# Patient Record
Sex: MALE | Race: WHITE
[De-identification: names, ages, dates, MRNs, and addresses within clinical notes are randomized per-mention and may not be internally consistent; named-entity substitution may affect disease eponyms.]

---

## 2022-02-23 ENCOUNTER — HOSPITAL ENCOUNTER (EMERGENCY)
Dept: HOSPITAL 47 - EC | Age: 37
Discharge: HOME | End: 2022-02-23
Payer: COMMERCIAL

## 2022-02-23 VITALS — SYSTOLIC BLOOD PRESSURE: 134 MMHG | HEART RATE: 99 BPM | DIASTOLIC BLOOD PRESSURE: 91 MMHG

## 2022-02-23 VITALS — TEMPERATURE: 98.9 F

## 2022-02-23 VITALS — RESPIRATION RATE: 18 BRPM

## 2022-02-23 DIAGNOSIS — K81.9: Primary | ICD-10-CM

## 2022-02-23 LAB
ALBUMIN SERPL-MCNC: 4.8 G/DL (ref 3.5–5)
ALP SERPL-CCNC: 53 U/L (ref 38–126)
ALT SERPL-CCNC: 92 U/L (ref 4–49)
AMYLASE SERPL-CCNC: 73 U/L (ref 30–110)
ANION GAP SERPL CALC-SCNC: 11 MMOL/L
AST SERPL-CCNC: 43 U/L (ref 17–59)
BASOPHILS # BLD AUTO: 0.1 K/UL (ref 0–0.2)
BASOPHILS NFR BLD AUTO: 1 %
BUN SERPL-SCNC: 17 MG/DL (ref 9–20)
CALCIUM SPEC-MCNC: 10.1 MG/DL (ref 8.4–10.2)
CHLORIDE SERPL-SCNC: 103 MMOL/L (ref 98–107)
CO2 SERPL-SCNC: 26 MMOL/L (ref 22–30)
EOSINOPHIL # BLD AUTO: 0.1 K/UL (ref 0–0.7)
EOSINOPHIL NFR BLD AUTO: 1 %
ERYTHROCYTE [DISTWIDTH] IN BLOOD BY AUTOMATED COUNT: 5.01 M/UL (ref 4.3–5.9)
ERYTHROCYTE [DISTWIDTH] IN BLOOD: 12.9 % (ref 11.5–15.5)
GLUCOSE SERPL-MCNC: 160 MG/DL (ref 74–99)
HCT VFR BLD AUTO: 46.9 % (ref 39–53)
HGB BLD-MCNC: 15.9 GM/DL (ref 13–17.5)
LIPASE SERPL-CCNC: 67 U/L (ref 23–300)
LYMPHOCYTES # SPEC AUTO: 2.4 K/UL (ref 1–4.8)
LYMPHOCYTES NFR SPEC AUTO: 21 %
MCH RBC QN AUTO: 31.7 PG (ref 25–35)
MCHC RBC AUTO-ENTMCNC: 33.8 G/DL (ref 31–37)
MCV RBC AUTO: 93.6 FL (ref 80–100)
MONOCYTES # BLD AUTO: 0.6 K/UL (ref 0–1)
MONOCYTES NFR BLD AUTO: 5 %
NEUTROPHILS # BLD AUTO: 8.3 K/UL (ref 1.3–7.7)
NEUTROPHILS NFR BLD AUTO: 72 %
PH UR: 6 [PH] (ref 5–8)
PLATELET # BLD AUTO: 248 K/UL (ref 150–450)
POTASSIUM SERPL-SCNC: 4.2 MMOL/L (ref 3.5–5.1)
PROT SERPL-MCNC: 8.4 G/DL (ref 6.3–8.2)
SODIUM SERPL-SCNC: 140 MMOL/L (ref 137–145)
SP GR UR: 1.02 (ref 1–1.03)
UROBILINOGEN UR QL STRIP: <2 MG/DL (ref ?–2)
WBC # BLD AUTO: 11.6 K/UL (ref 3.8–10.6)

## 2022-02-23 PROCEDURE — 81003 URINALYSIS AUTO W/O SCOPE: CPT

## 2022-02-23 PROCEDURE — 96375 TX/PRO/DX INJ NEW DRUG ADDON: CPT

## 2022-02-23 PROCEDURE — 82150 ASSAY OF AMYLASE: CPT

## 2022-02-23 PROCEDURE — 80053 COMPREHEN METABOLIC PANEL: CPT

## 2022-02-23 PROCEDURE — 99284 EMERGENCY DEPT VISIT MOD MDM: CPT

## 2022-02-23 PROCEDURE — 85025 COMPLETE CBC W/AUTO DIFF WBC: CPT

## 2022-02-23 PROCEDURE — 83690 ASSAY OF LIPASE: CPT

## 2022-02-23 PROCEDURE — 96374 THER/PROPH/DIAG INJ IV PUSH: CPT

## 2022-02-23 PROCEDURE — 96361 HYDRATE IV INFUSION ADD-ON: CPT

## 2022-02-23 PROCEDURE — 96372 THER/PROPH/DIAG INJ SC/IM: CPT

## 2022-02-23 PROCEDURE — 83605 ASSAY OF LACTIC ACID: CPT

## 2022-02-23 PROCEDURE — 76705 ECHO EXAM OF ABDOMEN: CPT

## 2022-02-23 PROCEDURE — 36415 COLL VENOUS BLD VENIPUNCTURE: CPT

## 2022-02-23 NOTE — US
EXAMINATION TYPE: US gallbladder

 

DATE OF EXAM: 2/23/2022

 

COMPARISON: NONE

 

CLINICAL HISTORY: pain. RUQ/Epigastric pain

 

EXAM MEASUREMENTS:

 

Liver Length:  23.4 cm   

Gallbladder Wall:  0.2 cm   

CBD:  0.5 cm

Right Kidney:  10.4 x 6.4 x 5.4 cm

 

***Limited due to bowel gas

 

Pancreas:  Obscured by bowel gas

Liver:  Increased attenuation, decreased visualization of vessels suggestive of fatty infiltrate.  En
larged in size.  Echogenic in appearance.    

Gallbladder:   mobile stone = 1.2 cm.  No bladder Enlarged in size = 12.7 cm 

**Evidence for sonographic Ramirez's sign:  positive

CBD:  limited visualization 

Right Kidney:  No hydronephrosis or masses seen 

 

 

 

IMPRESSION: 

1. Cholelithiasis. Correlate for acute cholecystitis.

## 2022-02-23 NOTE — ED
General Adult HPI





- General


Chief complaint: Abdominal Pain


Stated complaint: Abdominal pain


Time Seen by Provider: 02/23/22 06:25


Source: patient, RN notes reviewed, old records reviewed


Mode of arrival: ambulatory





- History of Present Illness


Initial comments: 





36-year-old male presents to the emergency room with complaints of right upper 

quadrant abdominal pain after eating spicy food last night at 6 PM.  Patient 

states that he has had this similar episode with eating spicy foods in the past.

 His doctor put him on succrafalate and Protonix.  He states the succrafalate ma

kes his pain worse and he did not take it today.  He has no previous abdominal 

surgeries.  He denies any fevers or nausea vomiting or diarrhea.  He describes 

the pain is 10 out of 10 and cramping in nature.


-: hour(s) (12)


Location: abdomen (RUQ)


Radiation: non-radiation


Severity scale (1-10): 10


Quality: other (cramping)


Associated Symptoms: denies other symptoms


Treatments Prior to Arrival: other (Protonix )





- Related Data


                                    Allergies











Allergy/AdvReac Type Severity Reaction Status Date / Time


 


tree nut [Nut] Allergy  Unknown Verified 02/23/22 06:05





   Childhood  














Review of Systems


ROS Statement: 


Those systems with pertinent positive or pertinent negative responses have been 

documented in the HPI.





ROS Other: All systems not noted in ROS Statement are negative.





Past Medical History


Past Medical History: No Reported History


History of Any Multi-Drug Resistant Organisms: None Reported


Past Surgical History: No Surgical Hx Reported


Past Psychological History: No Psychological Hx Reported


Smoking Status: Never smoker


Past Alcohol Use History: None Reported


Past Drug Use History: None Reported





General Exam


General appearance: alert, in no apparent distress


Eye exam: Present: normal appearance


Respiratory exam: Present: normal lung sounds bilaterally.  Absent: respiratory 

distress, wheezes, accessory muscle use, decreased breath sounds


Cardiovascular Exam: Present: regular rate


GI/Abdominal exam: Present: soft, tenderness (Right upper quadrant), normal 

bowel sounds.  Absent: rebound, rigid, hernia


Back exam: Present: normal inspection.  Absent: rash noted


Neurological exam: Present: alert, oriented X3, normal gait


Psychiatric exam: Present: normal affect, normal mood


Skin exam: Present: warm, dry, normal color.  Absent: cyanosis, diaphoretic, 

petechiae, pallor





Course


                                   Vital Signs











  02/23/22 02/23/22





  06:01 06:45


 


Temperature 98.9 F 


 


Pulse Rate  80


 


Respiratory  18





Rate  


 


Blood Pressure  134/80


 


O2 Sat by Pulse  98





Oximetry  














- Reevaluation(s)


Reevaluation #1: 





02/23/22 09:35


Patient states he did get some relief with pain medication provided.  He was 

able to tolerate juice and crackers, no nausea or vomiting.


Time: 09:35





Medical Decision Making





- Medical Decision Making


Patient presents to the emergency room with right upper quadrant pain after 

eating spicy foods yesterday.  He has had this similar pain in the past.  


Ultrasound of the right upper quadrant shows cholelithiasis.  There is a mobile 

stone measuring 1.2 cm.,  Common Bile duct measures 0.5 cm no evidence of 

obstruction.  Labs are unremarkable.  Patient did get pain relief with a GI 

cocktail and morphine.  This is likely pain caused by cholelithiasis.  He was 

given a referral to GI and encouraged to call today for appointment. Directed to

 continue his previously prescribed pantoprazole and sucralfate.  He was also 

directed to eat a gallbladder friendly diet which includes no greasy or spicy 

foods.  Return to the emergency room with any new or worsening symptoms.  

Follow-up with his primary care doctor next week.  Case discussed with Dr. Rubio








- Lab Data


Result diagrams: 


                                 02/23/22 06:46





                                 02/23/22 06:46


                                   Lab Results











  02/23/22 02/23/22 02/23/22 Range/Units





  06:46 06:46 06:46 


 


WBC  11.6 H    (3.8-10.6)  k/uL


 


RBC  5.01    (4.30-5.90)  m/uL


 


Hgb  15.9    (13.0-17.5)  gm/dL


 


Hct  46.9    (39.0-53.0)  %


 


MCV  93.6    (80.0-100.0)  fL


 


MCH  31.7    (25.0-35.0)  pg


 


MCHC  33.8    (31.0-37.0)  g/dL


 


RDW  12.9    (11.5-15.5)  %


 


Plt Count  248    (150-450)  k/uL


 


MPV  7.9    


 


Neutrophils %  72    %


 


Lymphocytes %  21    %


 


Monocytes %  5    %


 


Eosinophils %  1    %


 


Basophils %  1    %


 


Neutrophils #  8.3 H    (1.3-7.7)  k/uL


 


Lymphocytes #  2.4    (1.0-4.8)  k/uL


 


Monocytes #  0.6    (0-1.0)  k/uL


 


Eosinophils #  0.1    (0-0.7)  k/uL


 


Basophils #  0.1    (0-0.2)  k/uL


 


Sodium   140   (137-145)  mmol/L


 


Potassium   4.2   (3.5-5.1)  mmol/L


 


Chloride   103   ()  mmol/L


 


Carbon Dioxide   26   (22-30)  mmol/L


 


Anion Gap   11   mmol/L


 


BUN   17   (9-20)  mg/dL


 


Creatinine   0.94   (0.66-1.25)  mg/dL


 


Est GFR (CKD-EPI)AfAm   >90   (>60 ml/min/1.73 sqM)  


 


Est GFR (CKD-EPI)NonAf   >90   (>60 ml/min/1.73 sqM)  


 


Glucose   160 H   (74-99)  mg/dL


 


Lactic Ac Sepsis Rflx     


 


Plasma Lactic Acid Dima    2.1 H*  (0.7-2.0)  mmol/L


 


Calcium   10.1   (8.4-10.2)  mg/dL


 


Total Bilirubin   0.5   (0.2-1.3)  mg/dL


 


AST   43   (17-59)  U/L


 


ALT   92 H   (4-49)  U/L


 


Alkaline Phosphatase   53   ()  U/L


 


Total Protein   8.4 H   (6.3-8.2)  g/dL


 


Albumin   4.8   (3.5-5.0)  g/dL


 


Amylase   73   ()  U/L


 


Lipase   67   ()  U/L


 


Urine Color     


 


Urine Appearance     (Clear)  


 


Urine pH     (5.0-8.0)  


 


Ur Specific Gravity     (1.001-1.035)  


 


Urine Protein     (Negative)  


 


Urine Glucose (UA)     (Negative)  


 


Urine Ketones     (Negative)  


 


Urine Blood     (Negative)  


 


Urine Nitrite     (Negative)  


 


Urine Bilirubin     (Negative)  


 


Urine Urobilinogen     (<2.0)  mg/dL


 


Ur Leukocyte Esterase     (Negative)  














  02/23/22 02/23/22 Range/Units





  07:19 09:53 


 


WBC    (3.8-10.6)  k/uL


 


RBC    (4.30-5.90)  m/uL


 


Hgb    (13.0-17.5)  gm/dL


 


Hct    (39.0-53.0)  %


 


MCV    (80.0-100.0)  fL


 


MCH    (25.0-35.0)  pg


 


MCHC    (31.0-37.0)  g/dL


 


RDW    (11.5-15.5)  %


 


Plt Count    (150-450)  k/uL


 


MPV    


 


Neutrophils %    %


 


Lymphocytes %    %


 


Monocytes %    %


 


Eosinophils %    %


 


Basophils %    %


 


Neutrophils #    (1.3-7.7)  k/uL


 


Lymphocytes #    (1.0-4.8)  k/uL


 


Monocytes #    (0-1.0)  k/uL


 


Eosinophils #    (0-0.7)  k/uL


 


Basophils #    (0-0.2)  k/uL


 


Sodium    (137-145)  mmol/L


 


Potassium    (3.5-5.1)  mmol/L


 


Chloride    ()  mmol/L


 


Carbon Dioxide    (22-30)  mmol/L


 


Anion Gap    mmol/L


 


BUN    (9-20)  mg/dL


 


Creatinine    (0.66-1.25)  mg/dL


 


Est GFR (CKD-EPI)AfAm    (>60 ml/min/1.73 sqM)  


 


Est GFR (CKD-EPI)NonAf    (>60 ml/min/1.73 sqM)  


 


Glucose    (74-99)  mg/dL


 


Lactic Ac Sepsis Rflx  Y   


 


Plasma Lactic Acid Dima    (0.7-2.0)  mmol/L


 


Calcium    (8.4-10.2)  mg/dL


 


Total Bilirubin    (0.2-1.3)  mg/dL


 


AST    (17-59)  U/L


 


ALT    (4-49)  U/L


 


Alkaline Phosphatase    ()  U/L


 


Total Protein    (6.3-8.2)  g/dL


 


Albumin    (3.5-5.0)  g/dL


 


Amylase    ()  U/L


 


Lipase    ()  U/L


 


Urine Color   Yellow  


 


Urine Appearance   Clear  (Clear)  


 


Urine pH   6.0  (5.0-8.0)  


 


Ur Specific Gravity   1.017  (1.001-1.035)  


 


Urine Protein   Negative  (Negative)  


 


Urine Glucose (UA)   Negative  (Negative)  


 


Urine Ketones   Negative  (Negative)  


 


Urine Blood   Negative  (Negative)  


 


Urine Nitrite   Negative  (Negative)  


 


Urine Bilirubin   Negative  (Negative)  


 


Urine Urobilinogen   <2.0  (<2.0)  mg/dL


 


Ur Leukocyte Esterase   Negative  (Negative)  














Disposition


Clinical Impression: 


 Cholecystitis, Abdominal pain





Disposition: HOME SELF-CARE


Condition: Good


Instructions (If sedation given, give patient instructions):  Cholecystitis 

(ED), Abdominal Pain (ED)


Additional Instructions: 


Continue your pantoprazole as previously prescribed.  Call to make an 

appointment with gastroenterology this week.  Discontinue eating any spicy or 

fried foods until seen by gastroenterology, Dr. Fremean.  Follow-up with your 

primary care doctor this week.  Return to the emergency room with any new or 

worsening symptoms.


Is patient prescribed a controlled substance at d/c from ED?: No


Referrals: 


Nonstaff,Physician [Primary Care Provider] - 1-2 days


Xiomy Freeman MD [STAFF PHYSICIAN] - 1-2 days


Time of Disposition: 10:06

## 2022-06-20 ENCOUNTER — HOSPITAL ENCOUNTER (OUTPATIENT)
Dept: HOSPITAL 47 - OR | Age: 37
End: 2022-06-20
Attending: SURGERY
Payer: COMMERCIAL

## 2022-06-20 VITALS — RESPIRATION RATE: 18 BRPM | DIASTOLIC BLOOD PRESSURE: 68 MMHG | SYSTOLIC BLOOD PRESSURE: 118 MMHG | HEART RATE: 99 BPM

## 2022-06-20 VITALS — TEMPERATURE: 97.5 F

## 2022-06-20 DIAGNOSIS — K80.10: Primary | ICD-10-CM

## 2022-06-20 LAB — GLUCOSE BLD-MCNC: 107 MG/DL (ref 70–110)

## 2022-06-20 PROCEDURE — 88304 TISSUE EXAM BY PATHOLOGIST: CPT

## 2022-06-20 PROCEDURE — 47562 LAPAROSCOPIC CHOLECYSTECTOMY: CPT

## 2022-06-20 RX ADMIN — HYDROMORPHONE HYDROCHLORIDE PRN MG: 1 INJECTION, SOLUTION INTRAMUSCULAR; INTRAVENOUS; SUBCUTANEOUS at 18:15

## 2022-06-20 RX ADMIN — HYDROMORPHONE HYDROCHLORIDE PRN MG: 1 INJECTION, SOLUTION INTRAMUSCULAR; INTRAVENOUS; SUBCUTANEOUS at 17:20

## 2022-06-20 NOTE — P.OP
Date of Procedure: 06/20/22


Procedure(s) Performed: 


PREOPERATIVE DIAGNOSIS: Chronic cholecystitis


POSTOPERATIVE DIAGNOSIS: Same


PROCEDURE: Laparoscopic cholecystectomy


SURGEON: Vern


EBL: 20 mL 


ANESTHESIA: Gen.


COMPLICATIONS: None


OPERATIVE PROCEDURE: The patient was brought and placed on the operating room 

table in the supine position.  The patient was placed under general anesthesia 

at that time.  The abdomen was prepped and draped in the usual sterile fashion. 

A small vertical infraumbilical incision was made.  The fascia was grasped with 

the Kocher forceps.  The fascia was retracted anteriorly.  The Veress needle was

advanced into the peritoneal cavity.  The saline drop test was normal.  

Insufflation took place up to 15 mmHg.  A 5 mm optical trocar was advanced and 

the peritoneal cavity.  2 additional 5 mm trochars were placed in the right 

upper quadrant under direct visualization.  A 12 mm trocar was advanced into the

epigastric incision site.  The gallbladder was retracted superiorly and 

laterally.  The peritoneum overlying the infundibulum was bluntly dissected.  

The patient's cystic duct was visualized.  The junction between the cystic duct 

common and hepatic duct was identified.  The critical view of safety was 

achieved after blunt dissection.  The cystic duct was then divided after 

placement of 3 12 mm clips on the patient's side and one on the specimen side.  

The cystic artery was identified and clipped as well.  A small vessel was seen 

along the gallbladder fossa and clipped as well.  The gallbladder was then 

removed from the liver bed using electrocautery.  The gallbladder was then 

removed from the epigastric trocar site with an Endo Catch bag.  The gallbladder

fossa was irrigated with saline.  There was no evidence of any bleeding or 

biliary drainage seen.  The fascia at the 12 millimeter site was closed using a 

Charlie-Shaye 0 Vicryl stitch.  The trochars were then removed.  The skin at 

all 4 sites was closed using a 4-0 Monocryl stitch.  Skin glue was utilized on 

the incision sites.  At the end of this procedure the sponge and needle counts 

were correct.


DISPOSITION: Stable to the recovery room

## 2022-06-20 NOTE — P.GSHP
History of Present Illness


H&P Date: 06/20/22


Chief Complaint: chronic cholecystitis





37-year-old male here today for elective cholecystectomy.  Seen in the office 

about one month ago.  He was in the ER in February with right upper quadrant 

pain that radiated to his back.  Having mild discomfort occasionally now.  

Ultrasound shows gallstones.  Normal liver enzymes.





Past Medical History


Past Medical History: GERD/Reflux


History of Any Multi-Drug Resistant Organisms: None Reported


Past Surgical History: No Surgical Hx Reported


Additional Past Anesthesia/Blood Transfusion Reaction / Comment(s): NEVER HAD 

ANESTHESIA


Past Psychological History: Anxiety, Depression, PTSD


Smoking Status: Former smoker


Past Alcohol Use History: Rare


Additional Past Alcohol Use History / Comment(s): QUIT IN OCTOBER 2021 LESS THAN

PACK


Past Drug Use History: None Reported





- Past Family History


  ** Mother


Family Medical History: No Reported History





Medications and Allergies


                                Home Medications











 Medication  Instructions  Recorded  Confirmed  Type


 


Calcium Carbonate [Tums] 1 - 2 tab PO BID PRN 06/17/22 06/17/22 History


 


Ibuprofen [Motrin Ib] 200 mg PO Q6H PRN 06/17/22 06/17/22 History


 


Pantoprazole [Protonix] 40 mg PO DAILY PRN 06/17/22 06/17/22 History


 


clonazePAM [KlonoPIN] 2 mg PO HS PRN 06/17/22 06/17/22 History


 


diphenhydrAMINE [Benadryl] 25 mg PO HS PRN 06/17/22 06/17/22 History








                                    Allergies











Allergy/AdvReac Type Severity Reaction Status Date / Time


 


tree nut [Nut] Allergy  Unknown Verified 06/17/22 13:32





   Childhood  














Surgical - Exam








Physical exam:


General: Well-developed, well-nourished


HEENT: Normocephalic, sclerae nonicteric


Abdomen: Nontender, nondistended


Extremities: No edema


Neuro: Alert and oriented





Assessment and Plan


(1) Chronic cholecystitis


Narrative/Plan: 


Will proceed with laparoscopic cholecystectomy. Risks of bleeding, infection, 

bile leak, bile duct injury, retained common bile duct stone, trocar injury, 

conversion to an open procedure, hernia, anesthesia related complications were 

reviewed.  The patient understands and wishes to proceed.


Status: Acute   Code(s): K81.1 - CHRONIC CHOLECYSTITIS   SNOMED Code(s): 

06238983